# Patient Record
Sex: FEMALE | Race: WHITE | NOT HISPANIC OR LATINO | Employment: FULL TIME | URBAN - METROPOLITAN AREA
[De-identification: names, ages, dates, MRNs, and addresses within clinical notes are randomized per-mention and may not be internally consistent; named-entity substitution may affect disease eponyms.]

---

## 2023-09-08 ENCOUNTER — TELEPHONE (OUTPATIENT)
Dept: PLASTIC SURGERY | Facility: CLINIC | Age: 18
End: 2023-09-08

## 2023-09-08 NOTE — TELEPHONE ENCOUNTER
[facility] Call Center    Phone Message    May a detailed message be left on voicemail: yes     Reason for Call: Other: Patient called to schedule Top Surgery Consult with Dr. Nuñez.  F to M and preferred pronouns are: He/Him.  Please follow up with patient.     Action Taken: Message routed to:  [facility]    Travel Screening: Not Applicable

## 2023-09-11 ENCOUNTER — TELEPHONE (OUTPATIENT)
Dept: PLASTIC SURGERY | Facility: CLINIC | Age: 18
End: 2023-09-11

## 2023-09-11 DIAGNOSIS — F64.0 GENDER DYSPHORIA IN ADULT: Primary | ICD-10-CM

## 2023-09-11 NOTE — CONFIDENTIAL NOTE
[facility] :  Care Coordination Note     SITUATION   Colleen Everett (he/him) is a 17 year old adult who is receiving support for:  Care Team  .    BACKGROUND     Pt is scheduled for a gender affirming mastectomy consult with Dr. Castañeda on 2/14/24.     Pt is working on getting health insurance, and stated he will reach out to update when he's covered. Writer gave self pay estimate via [employer] message on 9/11/23.     ASSESSMENT     Surgery              [facility] Assessment  [facility] ([facility] Enrollment: (P) Enrolled  Patient has a therapist: (P) No  Letter of support #1: (P) Requested  Surgery being considered: (P) Yes  Mastectomy: (P) Yes    Pt reports:   No nicotine or other gender affirming surgeries    PLAN          Nursing Interventions:       Follow-up plan:  1. Establish with  provider and obtain JAMES Anthony

## 2023-10-22 ENCOUNTER — HEALTH MAINTENANCE LETTER (OUTPATIENT)
Age: 18
End: 2023-10-22

## 2023-11-24 NOTE — TELEPHONE ENCOUNTER
FUTURE VISIT INFORMATION      FUTURE VISIT INFORMATION:  Date: 2/14/24  Time: 1:30pm  Location: [facility]  REFERRAL INFORMATION:  Reason for visit/diagnosis  top consult    RECORDS REQUESTED FROM:       No recs to collect

## 2023-11-27 ENCOUNTER — TELEPHONE (OUTPATIENT)
Dept: PLASTIC SURGERY | Facility: CLINIC | Age: 18
End: 2023-11-27
Payer: COMMERCIAL

## 2023-11-27 NOTE — CONFIDENTIAL NOTE
Writer MILDRED re: need to reschedule consult with Dr. Castañeda on 2/14. Pt read previous mychart. Second attempt to contact.

## 2023-12-08 ENCOUNTER — TELEPHONE (OUTPATIENT)
Dept: PLASTIC SURGERY | Facility: CLINIC | Age: 18
End: 2023-12-08
Payer: COMMERCIAL

## 2023-12-08 NOTE — CONFIDENTIAL NOTE
Writer called re: third attempt to reach to reschedule 12/14/24 consult with Dr. Castañeda. Writer asked pt to respond by Monday 12/11 to reschedule, otherwise we will cancel. Pt read previous [employer] message.

## 2024-02-13 ENCOUNTER — HOSPITAL ENCOUNTER (EMERGENCY)
Facility: CLINIC | Age: 19
Discharge: HOME | End: 2024-02-13
Payer: COMMERCIAL

## 2024-02-13 VITALS
DIASTOLIC BLOOD PRESSURE: 58 MMHG | SYSTOLIC BLOOD PRESSURE: 139 MMHG | WEIGHT: 230 LBS | RESPIRATION RATE: 18 BRPM | TEMPERATURE: 98.3 F | OXYGEN SATURATION: 100 % | HEART RATE: 80 BPM

## 2024-02-13 DIAGNOSIS — M54.50 LUMBAR BACK PAIN: ICD-10-CM

## 2024-02-13 PROCEDURE — 99284 EMERGENCY DEPT VISIT MOD MDM: CPT

## 2024-02-13 PROCEDURE — 250N000013 HC RX MED GY IP 250 OP 250 PS 637

## 2024-02-13 RX ORDER — LIDOCAINE 4 G/G
1 PATCH TOPICAL ONCE
Status: DISCONTINUED | OUTPATIENT
Start: 2024-02-13 | End: 2024-02-14 | Stop reason: HOSPADM

## 2024-02-13 RX ORDER — ACETAMINOPHEN 325 MG/1
650 TABLET ORAL ONCE
Status: COMPLETED | OUTPATIENT
Start: 2024-02-13 | End: 2024-02-13

## 2024-02-13 RX ORDER — IBUPROFEN 600 MG/1
600 TABLET, FILM COATED ORAL ONCE
Status: COMPLETED | OUTPATIENT
Start: 2024-02-13 | End: 2024-02-13

## 2024-02-13 RX ORDER — LIDOCAINE 50 MG/G
1 PATCH TOPICAL EVERY 24 HOURS
Qty: 5 PATCH | Refills: 0 | Status: SHIPPED | OUTPATIENT
Start: 2024-02-13 | End: 2024-02-18

## 2024-02-13 RX ORDER — CYCLOBENZAPRINE HCL 10 MG
10 TABLET ORAL ONCE
Status: COMPLETED | OUTPATIENT
Start: 2024-02-13 | End: 2024-02-13

## 2024-02-13 RX ORDER — CYCLOBENZAPRINE HCL 10 MG
10 TABLET ORAL 3 TIMES DAILY PRN
Qty: 10 TABLET | Refills: 0 | Status: SHIPPED | OUTPATIENT
Start: 2024-02-13

## 2024-02-13 RX ADMIN — CYCLOBENZAPRINE HYDROCHLORIDE 10 MG: 10 TABLET, FILM COATED ORAL at 22:08

## 2024-02-13 RX ADMIN — IBUPROFEN 600 MG: 600 TABLET ORAL at 22:08

## 2024-02-13 RX ADMIN — ACETAMINOPHEN 650 MG: 325 TABLET, FILM COATED ORAL at 22:07

## 2024-02-13 RX ADMIN — LIDOCAINE 1 PATCH: 560 PATCH PERCUTANEOUS; TOPICAL; TRANSDERMAL at 22:08

## 2024-02-13 NOTE — Clinical Note
Mirna Everett was seen and treated in our emergency department on 2/13/2024.  He may return to work on 02/14/2024.  Patient was seen for mechanical low back pain. Can return to work 2/14/24, but with restrictions of no heavy lifting or straining, bending.     If you have any questions or concerns, please don't hesitate to call.      Noy Ramirez PA-C

## 2024-02-14 ENCOUNTER — PRE VISIT (OUTPATIENT)
Dept: PLASTIC SURGERY | Facility: CLINIC | Age: 19
End: 2024-02-14

## 2024-02-14 NOTE — ED PROVIDER NOTES
History     Chief Complaint:  Back Pain       The history is provided by the patient.      Mirna Everett is a 18 year old adult presenting to the ED for evaluation of lower back pain. The patient reports that he was lifting heavy containers at work four days ago when he began experiencing lower back pain. This pain was not severe, but it was then exacerbated by pushing a heavy cart today at work. The lower back pain reportedly radiated upwards, and has been more severe ever since. He currently rates his pain as a 5/10 and it is exacerbated upon exertion. He denies numbness, tingling, fever, urinary symptoms, or unusual bowel movements. He also deny a history of being immunocompromised, HIV, or IV drug use. Note, the patient does not have a primary care provider currently, but he is otherwise healthy.     Independent Historian:   None - Patient Only    Review of External Notes:   None     Medications:    Testosterone    Past Medical History:    The patient denies a past medical history.    Physical Exam   Patient Vitals for the past 24 hrs:   BP Temp Temp src Pulse Resp SpO2 Weight   02/13/24 2056 139/58 98.3  F (36.8  C) Temporal 80 18 100 % 104.3 kg (230 lb)      Physical Exam  General: Nontoxic-appearing adult sitting on gurney.  HENT:   Head: Atraumatic.  Mouth/Throat: Oropharynx clear and moist.  Eyes: Conjunctive and EOM normal. PERRLA.  Neck: Normal ROM. No rigidity.  CV: Regular rate and rhythm. Normal S1, S2. No appreciable murmurs.  Resp: Lungs clear to auscultation bilaterally. Normal respiratory effort.   GI: Abdomen soft, non distended and nontender  MSK: Normal range of motion. No midline T-spine or L-spine tenderness. Right paraspinal musculature tenderness. 5/5 strength hip flexion extension, knee flexion extension, ankle dorsiflexion and plantarflexion.   Skin: Warm and dry.  Neuro: Awake, alert, oriented x 3. Patellar reflexes intact bilateral lower extremities.. Sensation to touch intact  bilateral lower extremities.   Psych: Normal mood and affect.    Emergency Department Course   Emergency Department Course & Assessments:    Interventions:  Medications   acetaminophen (TYLENOL) tablet 650 mg (650 mg Oral $Given 2/13/24 2207)   ibuprofen (ADVIL/MOTRIN) tablet 600 mg (600 mg Oral $Given 2/13/24 2208)   cyclobenzaprine (FLEXERIL) tablet 10 mg (10 mg Oral $Given 2/13/24 2208)      Independent Interpretation (X-rays, CTs, rhythm strip):  None    Assessments/Consultations/Discussion of Management or Tests:   ED Course as of 02/14/24 0158   Tue Feb 13, 2024 2141 I obtained history and examined the patient as noted above.       Social Determinants of Health affecting care:   None    Disposition:  The patient was discharged.     Impression & Plan    CMS Diagnoses: None    Medical Decision Making:  Colleen is a remarkably pleasant 18-year-old adult who came into the emergency department for lower back pain after lifting at work per HPI above.  There was no mechanism concerning for acute fracture and there is no midline tenderness warranting imaging.  There is no fever, immunocompromise, IV drug use history concerning for potential spinal infection.  Patient is neurologically intact and there is no bowel incontinence, bladder incontinence, urinary retention or saddle paresthesia concerning for acute cauda equina syndrome.  Patient has some paraspinal muscular tenderness and we will treat with Tylenol and ibuprofen, lidocaine patch and muscle relaxant.  Patient had a safe ride home and was warned never to drive a car or operate heavy machinery while taking a muscle relaxant.  Patient will follow-up closely with primary care for recheck.  In the meantime reasons to return to the emergency department were provided in writing and discussed with the patient at the time of discharge.    Diagnosis:    ICD-10-CM    1. Lumbar back pain  M54.50 Primary Care Referral           Discharge Medications:  Discharge Medication  List as of 2/13/2024 10:08 PM        START taking these medications    Details   cyclobenzaprine (FLEXERIL) 10 MG tablet Take 1 tablet (10 mg) by mouth 3 times daily as needed for muscle spasms, Disp-10 tablet, R-0, E-Prescribe      lidocaine (LIDODERM) 5 % patch Place 1 patch onto the skin every 24 hours for 5 doses To prevent lidocaine toxicity, patient should be patch free for 12 hrs daily.Disp-5 patch, [id number]           Scribe Disclosure:  I, Karmen Herrera, am serving as a scribe at 10:04 PM on 2/13/2024 to document services personally performed by Noy Ramirez PA-C based on my observations and the provider's statements to me.     2/13/2024   Noy Ramirez PA-C Dewing, Jennifer C, PA-C  02/14/24 0158

## 2024-02-14 NOTE — DISCHARGE INSTRUCTIONS
Discharge Instructions  Back Pain  You were seen today for back pain. Back pain can have many causes, but most will get better without surgery or other specific treatment. Sometimes there is a herniated ( slipped ) disc. We do not usually do MRI scans to look for these right away, since most herniated discs will get better on their own with time.  Today, we did not find any evidence that your back pain was caused by a serious condition. However, sometimes symptoms develop over time and cannot be found during an emergency visit, so it is very important that you follow up with your primary provider.  Generally, every Emergency Department visit should have a follow-up clinic visit with either a primary or a specialty clinic/provider. Please follow-up as instructed by your emergency provider today.    Return to the Emergency Department if:  You develop a fever with your back pain.   You have weakness or change in sensation in one or both legs.  You lose control of your bowels or bladder, or cannot empty your bladder (cannot pee).  Your pain gets much worse.     Follow-up with your provider:  Unless your pain has completely gone away, please make an appointment with your provider within one week. Most of the routine care for back pain is available in a clinic and not the Emergency Department. You may need further management of your back pain, such as more pain medication, imaging such as an X-ray or MRI, or physical therapy.    What can I do to help myself?  Remain Active -- People are often afraid that they will hurt their back further or delay recovery by remaining active, but this is one of the best things you can do for your back. In fact, staying in bed for a long time to rest is not recommended. Studies have shown that people with low back pain recover faster when they remain active. Movement helps to bring blood flow to the muscles and relieve muscle spasms as well as preventing loss of muscle strength.  Heat --  Using a heating pad can help with low back pain during the first few weeks. Do not sleep with a heating pad, as you can be burned.   Pain medications - You may take 600mg Ibuprofen alternating with 650mg Tylenol  I prescribed lidocaine patches for supportive care  I prescribed  a muscle relaxant for spasms. Do not drive a car or operate heavy machinery while on this.  I referred you to to a PCP for follow up- you'll get a call from  [employer] for recheck  If you were given a prescription for medicine here today, be sure to read all of the information (including the package insert) that comes with your prescription.  This will include important information about the medicine, its side effects, and any warnings that you need to know about.  The pharmacist who fills the prescription can provide more information and answer questions you may have about the medicine.  If you have questions or concerns that the pharmacist cannot address, please call or return to the Emergency Department.   Remember that you can always come back to the Emergency Department if you are not able to see your regular provider in the amount of time listed above, if you get any new symptoms, or if there is anything that worries you.

## 2024-02-14 NOTE — ED TRIAGE NOTES
Pt reports lower back pain R>L that began after lifting heavy boxes at work on Friday. Yesterday pt reports pain will radiate up to upper right back and shoulder. Denies any bowel/bladder issues.      Triage Assessment (Adult)       Row Name 02/13/24 2058          Triage Assessment    Airway WDL WDL        Respiratory WDL    Respiratory WDL WDL        Skin Circulation/Temperature WDL    Skin Circulation/Temperature WDL WDL        Cardiac WDL    Cardiac WDL WDL        Peripheral/Neurovascular WDL    Peripheral Neurovascular WDL WDL        Cognitive/Neuro/Behavioral WDL    Cognitive/Neuro/Behavioral WDL WDL

## 2024-10-24 ENCOUNTER — TELEPHONE (OUTPATIENT)
Dept: PLASTIC SURGERY | Facility: CLINIC | Age: 19
End: 2024-10-24
Payer: COMMERCIAL

## 2024-10-24 NOTE — TELEPHONE ENCOUNTER
[facility] Call Center    Phone Message    May a detailed message be left on voicemail: yes     Reason for Call: I'd like to have a consultation for a gender affirming surgery     Requesting Dr. Castañeda      Action Taken: Message routed to:  [facility]    Travel Screening: Not Applicable     Date of Service:

## 2024-10-24 NOTE — CONFIDENTIAL NOTE
Writer [facility] re: follow up on request for top surgery consult with Dr. Castañeda. Relayed that he's not accepting new pts for mastectomy anymore and Dr. Nuñez has a waitlist for a consult that's about a year out. Pt encouraged to call back if he wants to get on waitlist or receive list of top surgeons outside our system.

## 2024-12-15 ENCOUNTER — HEALTH MAINTENANCE LETTER (OUTPATIENT)
Age: 19
End: 2024-12-15